# Patient Record
Sex: FEMALE | Race: WHITE
[De-identification: names, ages, dates, MRNs, and addresses within clinical notes are randomized per-mention and may not be internally consistent; named-entity substitution may affect disease eponyms.]

---

## 2017-06-17 ENCOUNTER — HOSPITAL ENCOUNTER (EMERGENCY)
Dept: HOSPITAL 80 - FED | Age: 67
Discharge: HOME | End: 2017-06-17
Payer: COMMERCIAL

## 2017-06-17 VITALS
TEMPERATURE: 98.2 F | DIASTOLIC BLOOD PRESSURE: 89 MMHG | OXYGEN SATURATION: 94 % | HEART RATE: 75 BPM | RESPIRATION RATE: 18 BRPM | SYSTOLIC BLOOD PRESSURE: 138 MMHG

## 2017-06-17 DIAGNOSIS — J95.811: Primary | ICD-10-CM

## 2017-06-17 DIAGNOSIS — Z85.3: ICD-10-CM

## 2017-06-17 LAB
% IMMATURE GRANULYOCYTES: 0.4 % (ref 0–1.1)
ABSOLUTE IMMATURE GRANULOCYTES: 0.04 10^3/UL (ref 0–0.1)
ABSOLUTE NRBC COUNT: 0 10^3/UL (ref 0–0.01)
ADD DIFF?: NO
ADD MORPH?: NO
ADD SCAN?: NO
ALBUMIN SERPL-MCNC: 4.5 G/DL (ref 3.5–5)
ALP SERPL-CCNC: 87 IU/L (ref 38–126)
ALT SERPL-CCNC: 35 IU/L (ref 9–52)
ANION GAP SERPL CALC-SCNC: 13 MEQ/L (ref 8–16)
APTT BLD: 28.1 SEC (ref 23–38)
AST SERPL-CCNC: 32 IU/L (ref 14–46)
ATYPICAL LYMPHOCYTE FLAG: 0 (ref 0–99)
BILIRUB SERPL-MCNC: 0.2 MG/DL (ref 0.1–1.4)
BILIRUBIN-CONJUGATED: 0 MG/DL (ref 0–0.5)
BILIRUBIN-UNCONJUGATED: 0.2 MG/DL (ref 0–1.1)
CALCIUM SERPL-MCNC: 9.6 MG/DL (ref 8.5–10.4)
CHLORIDE SERPL-SCNC: 103 MEQ/L (ref 97–110)
CO2 SERPL-SCNC: 22 MEQ/L (ref 22–31)
CREAT SERPL-MCNC: 0.8 MG/DL (ref 0.6–1)
ERYTHROCYTE [DISTWIDTH] IN BLOOD BY AUTOMATED COUNT: 12.7 % (ref 11.5–15.2)
FRAGMENT RBC FLAG: 0 (ref 0–99)
GFR SERPL CREATININE-BSD FRML MDRD: > 60 ML/MIN/{1.73_M2}
GLUCOSE SERPL-MCNC: 92 MG/DL (ref 70–100)
HCT VFR BLD CALC: 38.8 % (ref 38–47)
HGB BLD-MCNC: 13.3 G/DL (ref 12.6–16.3)
INR PPP: 0.97 (ref 0.83–1.16)
LEFT SHIFT FLG: 10 (ref 0–99)
LIPEMIA HEMOLYSIS FLAG: 90 (ref 0–99)
MCH RBC BLDCO QN: 30.6 PG (ref 27.9–34.1)
MCHC RBC AUTO-ENTMCNC: 34.3 G/DL (ref 32.4–36.7)
MCV RBC AUTO: 89.2 FL (ref 81.5–99.8)
NRBC-AUTO%: 0 % (ref 0–0.2)
PLATELET # BLD: 330 10^3/UL (ref 150–400)
PLATELET CLUMPS FLAG: 0 (ref 0–99)
PMV BLD AUTO: 9.5 FL (ref 8.7–11.7)
POTASSIUM SERPL-SCNC: 4.3 MEQ/L (ref 3.5–5.2)
PROT SERPL-MCNC: 7.6 G/DL (ref 6.3–8.2)
PROTHROMBIN TIME: 12.8 SEC (ref 12–15)
RBC # BLD AUTO: 4.35 10^6/UL (ref 4.18–5.33)
SODIUM SERPL-SCNC: 138 MEQ/L (ref 134–144)
TROPONIN I SERPL-MCNC: < 0.012 NG/ML (ref 0–0.03)

## 2017-06-17 PROCEDURE — 99285 EMERGENCY DEPT VISIT HI MDM: CPT

## 2017-06-17 PROCEDURE — 96360 HYDRATION IV INFUSION INIT: CPT

## 2017-06-17 PROCEDURE — 93005 ELECTROCARDIOGRAM TRACING: CPT

## 2017-06-17 PROCEDURE — 71275 CT ANGIOGRAPHY CHEST: CPT

## 2017-06-17 NOTE — EDPHY
H & P


Stated Complaint: 2 days r chest/r axillar and r under arm pain/worse with deep 

breath


Time Seen by Provider: 06/17/17 18:43


HPI/ROS: 





CHIEF COMPLAINT:  Right axillary pain





HISTORY OF PRESENT ILLNESS:  The patient is a 67-year-old female who comes to 

the emergency department complaining of rib pain. She states that she has pain 

with deep inspiration in the right axillary region.  She has a history of 

breast cancer on that side status post radiation and lumpectomy 5 years ago.  

She denies any recent trauma or injury. [place] 

[place] but had pain beginning yesterday.  She has not had a fever or cough.  

She denies shortness of breath but states that it hurts to take deep breath. 

The no history of cardiac or pulmonary disease.








REVIEW OF SYSTEMS:


Constitutional:  denies: chills, fever, recent illness, recent injury


EENTM: denies: blurred vision, double vision, nose congestion


Respiratory:  See HPI


Cardiac: denies: chest pain, irregular heart rate, lightheadedness, palpitations


Gastrointestinal/Abdominal: denies: abdominal pain, diarrhea, nausea, vomiting, 

blood streaked stools


Genitourinary: denies: dysuria, frequency, hematuria, pain


Musculoskeletal: denies: joint pain, muscle pain


Skin: denies: lesions, rash, jaundice, bruising


Neurological: denies: headache, numbness, paresthesia, tingling, dizziness, 

weakness


Hematologic/Lymphatic: denies: blood clots, easy bleeding, easy bruising


Immunologic/allergic: denies: HIV/AIDS, transplant








EXAM:


GENERAL:  Well-appearing, well-nourished and in no acute distress.


HEAD:  Atraumatic, normocephalic.


EYES:  Pupils equal round and reactive to light, extraocular movements intact, 

sclera anicteric, conjunctiva are normal.


ENT:  TMs normal, nares patent, oropharynx clear without exudates.  Moist 

mucous membranes.


NECK:  Normal range of motion, supple without lymphadenopathy or JVD.


LUNGS:  No tenderness to palpation, Breath sounds clear to auscultation 

bilaterally and equal.  No wheezes rales or rhonchi.


HEART:  Regular rate and rhythm without murmurs, rubs or gallops.


ABDOMEN:  Soft, nontender, normoactive bowel sounds.  No guarding, no rebound.  

No masses appreciated.


BACK:  No CVA tenderness, no spinal tenderness, step-offs or deformities


EXTREMITIES:  Normal range of motion, no pitting or edema.  No clubbing or 

cyanosis.


NEUROLOGICAL:  Cranial nerves II through XII grossly intact.  Normal speech, 

normal gait.  5/5 strength, normal movement in all extremities, normal sensation


PSYCH:  Normal mood, normal affect.


SKIN:  Warm, dry, normal turgor, no visible rashes or lesions.








Source: Patient


Exam Limitations: No limitations





- Personal History


Current Tetanus/Diphtheria Vaccine: Yes





- Medical/Surgical History


Hx Asthma: No


Hx Chronic Respiratory Disease: No


Hx Diabetes: No


Hx Cardiac Disease: No


Hx Renal Disease: No


Hx Cirrhosis: No


Hx Alcoholism: No


Hx HIV/AIDS: No


Hx Splenectomy or Spleen Trauma: No


Other PMH: breast cancer/glaucoma/thyroid surg





- Family History


Significant Family History: No pertinent family hx





- Social History


Smoking Status: Never smoked


Alcohol Use: Sober


Drug Use: None


Constitutional: 


 Initial Vital Signs











Temperature (C)  36.6 C   06/17/17 18:33


 


Heart Rate  70   06/17/17 18:33


 


Respiratory Rate  17   06/17/17 18:33


 


Blood Pressure  113/75   06/17/17 18:33


 


O2 Sat (%)  95   06/17/17 18:33








 











O2 Delivery Mode               Room Air














Allergies/Adverse Reactions: 


 





No Known Allergies Allergy (Verified 06/17/17 18:32)


 








Home Medications: 














 Medication  Instructions  Recorded


 


ARMSHAE THYROID  06/17/17


 


Latanoprost 0.005%  06/17/17














Medical Decision Making





- Diagnostics


EKG Interpretation: 





An EKG obtained and was read and documented in trace view.  Please see trace 

view for full reading and report.  Sinus rhythm, no acute ischemic changes 


ED Course/Re-evaluation: 





8:50 p.m. we discussed the patient's CT results.  It turns out that she had 

acupuncture yesterday just prior to this pain beginning.   I discussed the case 

with Dr. Verona Link.   she recommended the patient return tomorrow for repeat 

chest x-ray or to her office on Monday.  Patient will return here tomorrow.  

She would like to primarily take ibuprofen for the pain.  We discussed dosing.  

I will also give her a take-home pack and Vicodin if needed


Differential Diagnosis: 





Partial list of the Differential diagnosis considered include but were not 

limited to;  pneumothorax, PE, rib fracture and although unlikely based on the 

history and physical exam, I also considered acute coronary disease, pneumonia.

  I discussed these differential diagnoses and the plan with the patient as 

well as the usual and expected course.  The patient understands that the 

diagnosis is provisional and that in medicine we are not always correct and 

that further workup is often warranted.  Usual and customary warnings were 

given.  All of the patient's questions were answered.  The patient was 

instructed to return to the emergency department should the symptoms at all 

worsen or return, otherwise to followup with the physician as we discussed.





- Data Points


Laboratory Results: 


 Laboratory Results





 06/17/17 19:05 





 06/17/17 19:05 





 











  06/17/17 06/17/17 06/17/17





  19:05 19:05 19:05


 


WBC      11.15 10^3/uL H 10^3/uL





     (3.80-9.50) 


 


RBC      4.35 10^6/uL 10^6/uL





     (4.18-5.33) 


 


Hgb      13.3 g/dL g/dL





     (12.6-16.3) 


 


Hct      38.8 % %





     (38.0-47.0) 


 


MCV      89.2 fL fL





     (81.5-99.8) 


 


MCH      30.6 pg pg





     (27.9-34.1) 


 


MCHC      34.3 g/dL g/dL





     (32.4-36.7) 


 


RDW      12.7 % %





     (11.5-15.2) 


 


Plt Count      330 10^3/uL 10^3/uL





     (150-400) 


 


MPV      9.5 fL fL





     (8.7-11.7) 


 


Neut % (Auto)      70.8 % %





     (39.3-74.2) 


 


Lymph % (Auto)      15.9 % %





     (15.0-45.0) 


 


Mono % (Auto)      11.2 % %





     (4.5-13.0) 


 


Eos % (Auto)      1.3 % %





     (0.6-7.6) 


 


Baso % (Auto)      0.4 % %





     (0.3-1.7) 


 


Nucleat RBC Rel Count      0.0 % %





     (0.0-0.2) 


 


Absolute Neuts (auto)      7.90 10^3/uL H 10^3/uL





     (1.70-6.50) 


 


Absolute Lymphs (auto)      1.77 10^3/uL 10^3/uL





     (1.00-3.00) 


 


Absolute Monos (auto)      1.25 10^3/uL H 10^3/uL





     (0.30-0.80) 


 


Absolute Eos (auto)      0.14 10^3/uL 10^3/uL





     (0.03-0.40) 


 


Absolute Basos (auto)      0.05 10^3/uL 10^3/uL





     (0.02-0.10) 


 


Absolute Nucleated RBC      0.00 10^3/uL 10^3/uL





     (0-0.01) 


 


Immature Gran %      0.4 % %





     (0.0-1.1) 


 


Immature Gran #      0.04 10^3/uL 10^3/uL





     (0.00-0.10) 


 


PT    12.8 SEC SEC  





    (12.0-15.0)  


 


INR    0.97   





    (0.83-1.16)  


 


APTT    28.1 SEC SEC  





    (23.0-38.0)  


 


Sodium  138 mEq/L mEq/L    





   (134-144)   


 


Potassium  4.3 mEq/L mEq/L    





   (3.5-5.2)   


 


Chloride  103 mEq/L mEq/L    





   ()   


 


Carbon Dioxide  22 mEq/l mEq/l    





   (22-31)   


 


Anion Gap  13 mEq/L mEq/L    





   (8-16)   


 


BUN  17 mg/dL mg/dL    





   (7-23)   


 


Creatinine  0.8 mg/dL mg/dL    





   (0.6-1.0)   


 


Estimated GFR  > 60     





    


 


Glucose  92 mg/dL mg/dL    





   ()   


 


Calcium  9.6 mg/dL mg/dL    





   (8.5-10.4)   


 


Total Bilirubin  0.2 mg/dL mg/dL    





   (0.1-1.4)   


 


Conjugated Bilirubin  0.0 mg/dL mg/dL    





   (0.0-0.5)   


 


Unconjugated Bilirubin  0.2 mg/dL mg/dL    





   (0.0-1.1)   


 


AST  32 IU/L IU/L    





   (14-46)   


 


ALT  35 IU/L IU/L    





   (9-52)   


 


Alkaline Phosphatase  87 IU/L IU/L    





   ()   


 


Troponin I  < 0.012 ng/mL ng/mL    





   (0-0.034)   


 


Total Protein  7.6 g/dL g/dL    





   (6.3-8.2)   


 


Albumin  4.5 g/dL g/dL    





   (3.5-5.0)   


 


Lipase  196.0 IU/L IU/L    





   ()   











Medications Given: 


 








Discontinued Medications





Hydrocodone Bitart/Acetaminophen (Norco 5/325mg Prepack#6)  1 btl TAKEHOME 

EDNOW ONE


   Stop: 06/17/17 20:54


   Last Admin: 06/17/17 21:05 Dose:  1 btl


Aspirin (Aspirin)  324 mg PO EDNOW ONE


   Stop: 06/17/17 18:50


   Last Admin: 06/17/17 19:08 Dose:  324 mg


Sodium Chloride (Ns)  1,000 mls @ 0 mls/hr IV ONCE ONE; Wide Open


   PRN Reason: Protocol


   Stop: 06/17/17 18:50


   Last Admin: 06/17/17 19:21 Dose:  1,000 mls








Departure





- Departure


Disposition: Home, Routine, Self-Care


Clinical Impression: 


Pneumothorax


Qualifiers:


 Pneumothorax type: postprocedural Qualified Code(s): J95.811 - Postprocedural 

pneumothorax





Condition: Fair


Instructions:  Hydrocodone/Acetaminophen (By mouth), Traumatic Pneumothorax (ED)


Additional Instructions: 


Return tomorrow for repeat chest x-ray.


Referrals: 


[facility] [Primary Care Provider] - As per Instructions


Verona Link MD [Medical Doctor] - As per Instructions


[facility] [Medical Doctor] - As per Instructions

## 2017-06-17 NOTE — CPEKG
Heart Rate: 69

RR Interval: 870

P-R Interval: 140

QRSD Interval: 96

QT Interval: 408

QTC Interval: 437

P Axis: 38

QRS Axis: -63

T Wave Axis: 28

EKG Severity - ABNORMAL ECG -

EKG Impression: SINUS RHYTHM

EKG Impression: LEFT ANTERIOR FASCICULAR BLOCK

Electronically Signed By: Tanmay Mckeon 17-Jun-2017 18:56:08

## 2017-07-26 ENCOUNTER — HOSPITAL ENCOUNTER (OUTPATIENT)
Dept: HOSPITAL 80 - FIMAGING | Age: 67
End: 2017-07-26
Attending: FAMILY MEDICINE
Payer: COMMERCIAL

## 2017-07-26 DIAGNOSIS — E04.2: Primary | ICD-10-CM

## 2017-07-26 DIAGNOSIS — E89.0: ICD-10-CM

## 2017-12-29 ENCOUNTER — HOSPITAL ENCOUNTER (OUTPATIENT)
Dept: HOSPITAL 80 - FIMAGING | Age: 67
End: 2017-12-29
Attending: INTERNAL MEDICINE
Payer: COMMERCIAL

## 2017-12-29 DIAGNOSIS — Z12.31: Primary | ICD-10-CM

## 2017-12-29 DIAGNOSIS — Z85.3: ICD-10-CM

## 2017-12-29 DIAGNOSIS — Z92.3: ICD-10-CM

## 2017-12-29 PROCEDURE — G0202 SCR MAMMO BI INCL CAD: HCPCS

## 2018-01-05 ENCOUNTER — HOSPITAL ENCOUNTER (OUTPATIENT)
Dept: HOSPITAL 80 - FIMAGING | Age: 68
End: 2018-01-05
Attending: INTERNAL MEDICINE
Payer: COMMERCIAL

## 2018-01-05 DIAGNOSIS — R92.8: Primary | ICD-10-CM

## 2019-01-02 ENCOUNTER — HOSPITAL ENCOUNTER (OUTPATIENT)
Dept: HOSPITAL 80 - FIMAGING | Age: 69
End: 2019-01-02
Attending: INTERNAL MEDICINE
Payer: COMMERCIAL

## 2019-01-02 DIAGNOSIS — Z12.31: Primary | ICD-10-CM

## 2019-01-02 DIAGNOSIS — Z85.3: ICD-10-CM

## 2019-03-28 ENCOUNTER — HOSPITAL ENCOUNTER (OUTPATIENT)
Dept: HOSPITAL 80 - FIMAGING | Age: 69
End: 2019-03-28
Attending: FAMILY MEDICINE
Payer: COMMERCIAL

## 2019-03-28 DIAGNOSIS — M85.89: ICD-10-CM

## 2019-03-28 DIAGNOSIS — Z13.820: Primary | ICD-10-CM
